# Patient Record
Sex: FEMALE | Race: WHITE | Employment: OTHER | URBAN - NONMETROPOLITAN AREA
[De-identification: names, ages, dates, MRNs, and addresses within clinical notes are randomized per-mention and may not be internally consistent; named-entity substitution may affect disease eponyms.]

---

## 2017-03-06 ENCOUNTER — HOSPITAL ENCOUNTER (OUTPATIENT)
Dept: WOMENS IMAGING | Age: 62
Discharge: HOME | End: 2017-03-06
Payer: MEDICARE

## 2017-03-06 DIAGNOSIS — Z12.31 SCREENING MAMMOGRAM, ENCOUNTER FOR: ICD-10-CM

## 2017-03-06 PROCEDURE — G0202 SCR MAMMO BI INCL CAD: HCPCS

## 2017-03-22 ENCOUNTER — OFFICE VISIT (OUTPATIENT)
Dept: PSYCHIATRY | Age: 62
End: 2017-03-22
Payer: MEDICARE

## 2017-03-22 VITALS
HEART RATE: 92 BPM | HEIGHT: 63 IN | WEIGHT: 178.2 LBS | DIASTOLIC BLOOD PRESSURE: 89 MMHG | BODY MASS INDEX: 31.57 KG/M2 | SYSTOLIC BLOOD PRESSURE: 140 MMHG | OXYGEN SATURATION: 98 %

## 2017-03-22 DIAGNOSIS — F32.A DEPRESSION, UNSPECIFIED DEPRESSION TYPE: Primary | ICD-10-CM

## 2017-03-22 PROCEDURE — 3017F COLORECTAL CA SCREEN DOC REV: CPT | Performed by: PSYCHIATRY & NEUROLOGY

## 2017-03-22 PROCEDURE — G8427 DOCREV CUR MEDS BY ELIG CLIN: HCPCS | Performed by: PSYCHIATRY & NEUROLOGY

## 2017-03-22 PROCEDURE — 1036F TOBACCO NON-USER: CPT | Performed by: PSYCHIATRY & NEUROLOGY

## 2017-03-22 PROCEDURE — 99214 OFFICE O/P EST MOD 30 MIN: CPT | Performed by: PSYCHIATRY & NEUROLOGY

## 2017-03-22 PROCEDURE — G8419 CALC BMI OUT NRM PARAM NOF/U: HCPCS | Performed by: PSYCHIATRY & NEUROLOGY

## 2017-03-22 PROCEDURE — G8484 FLU IMMUNIZE NO ADMIN: HCPCS | Performed by: PSYCHIATRY & NEUROLOGY

## 2017-03-22 PROCEDURE — 3014F SCREEN MAMMO DOC REV: CPT | Performed by: PSYCHIATRY & NEUROLOGY

## 2017-03-22 RX ORDER — PHENTERMINE HYDROCHLORIDE 37.5 MG/1
37.5 TABLET ORAL
COMMUNITY

## 2017-06-27 ENCOUNTER — OFFICE VISIT (OUTPATIENT)
Dept: PSYCHIATRY | Age: 62
End: 2017-06-27
Payer: MEDICARE

## 2017-06-27 VITALS
HEART RATE: 98 BPM | DIASTOLIC BLOOD PRESSURE: 86 MMHG | SYSTOLIC BLOOD PRESSURE: 151 MMHG | WEIGHT: 176 LBS | OXYGEN SATURATION: 97 % | HEIGHT: 63 IN | BODY MASS INDEX: 31.18 KG/M2

## 2017-06-27 DIAGNOSIS — F33.0 MILD EPISODE OF RECURRENT MAJOR DEPRESSIVE DISORDER (HCC): Primary | ICD-10-CM

## 2017-06-27 PROCEDURE — G8419 CALC BMI OUT NRM PARAM NOF/U: HCPCS | Performed by: PSYCHIATRY & NEUROLOGY

## 2017-06-27 PROCEDURE — 1036F TOBACCO NON-USER: CPT | Performed by: PSYCHIATRY & NEUROLOGY

## 2017-06-27 PROCEDURE — 3017F COLORECTAL CA SCREEN DOC REV: CPT | Performed by: PSYCHIATRY & NEUROLOGY

## 2017-06-27 PROCEDURE — 3014F SCREEN MAMMO DOC REV: CPT | Performed by: PSYCHIATRY & NEUROLOGY

## 2017-06-27 PROCEDURE — G8428 CUR MEDS NOT DOCUMENT: HCPCS | Performed by: PSYCHIATRY & NEUROLOGY

## 2017-06-27 PROCEDURE — 99213 OFFICE O/P EST LOW 20 MIN: CPT | Performed by: PSYCHIATRY & NEUROLOGY

## 2017-06-27 PROCEDURE — 90833 PSYTX W PT W E/M 30 MIN: CPT | Performed by: PSYCHIATRY & NEUROLOGY

## 2017-08-21 ENCOUNTER — OFFICE VISIT (OUTPATIENT)
Dept: GASTROENTEROLOGY | Facility: CLINIC | Age: 62
End: 2017-08-21

## 2017-08-21 VITALS
BODY MASS INDEX: 35.3 KG/M2 | WEIGHT: 187 LBS | DIASTOLIC BLOOD PRESSURE: 80 MMHG | TEMPERATURE: 97.1 F | OXYGEN SATURATION: 99 % | HEIGHT: 61 IN | SYSTOLIC BLOOD PRESSURE: 130 MMHG | HEART RATE: 76 BPM

## 2017-08-21 DIAGNOSIS — Z80.0 FAMILY HISTORY OF COLON CANCER: Primary | ICD-10-CM

## 2017-08-21 PROCEDURE — S0260 H&P FOR SURGERY: HCPCS | Performed by: NURSE PRACTITIONER

## 2017-08-21 RX ORDER — BUPROPION HYDROCHLORIDE 75 MG/1
TABLET ORAL
COMMUNITY
Start: 2016-09-14

## 2017-08-21 RX ORDER — ESTRADIOL VALERATE 40 MG/ML
INJECTION INTRAMUSCULAR
COMMUNITY
End: 2018-02-12

## 2017-08-21 RX ORDER — NAPROXEN SODIUM 220 MG
220 TABLET ORAL 2 TIMES DAILY PRN
COMMUNITY

## 2017-08-21 NOTE — PROGRESS NOTES
Chief Complaint   Patient presents with   • Colonoscopy     had colon 1-22-10 showed anusitis/normal     Subjective   HPI    Angelia Vidales is a 61 y.o. female who presents to office for preventative maintenance.  There is not  a personal history of colon polyps.  There is not a history of colon cancer.  She does not have complaints of nausea/vomiting, change in bowels, weight loss, no BRBPR, no melena. Reports she has feelings of bloating occasionally.  States this is normal for her and r/t her fibromyalgia. There is a family history of colon cancer-father dx apx age of early 70s.  There is not a family history of colon polyps.  Pt last colonoscopy-2010 .  Bowels do move on regular basis.      History reviewed. No pertinent past medical history.  Past Surgical History:   Procedure Laterality Date   • CHOLECYSTECTOMY     • COLONOSCOPY  01/22/2010    Anusitis, otherwise normal.   • ENDOSCOPY  11/23/2007    mild gastritis, otherwise normal   • HYSTERECTOMY     • SINUS SURGERY       Outpatient Prescriptions Marked as Taking for the 8/21/17 encounter (Office Visit) with ESTEFANIA Linda   Medication Sig Dispense Refill   • buPROPion (WELLBUTRIN) 75 MG tablet Take  by mouth.     • Cholecalciferol (VITAMIN D) 1000 units tablet Take 1,000 Units by mouth Daily.     • estradiol valerate (DELESTROGEN) 40 MG/ML injection Inject  into the shoulder, thigh, or buttocks.     • hydrOXYzine (ATARAX) 10 MG tablet Take 10 mg by mouth 3 (Three) Times a Day As Needed for Itching.     • Levothyroxine Sodium (SYNTHROID PO) Take  by mouth.     • naproxen sodium (ALEVE) 220 MG tablet Take 220 mg by mouth 2 (Two) Times a Day As Needed.       Allergies   Allergen Reactions   • Penicillins    • Pyridium [Phenazopyridine Hcl]    • Zonisamide      Social History     Social History   • Marital status:      Spouse name: N/A   • Number of children: N/A   • Years of education: N/A     Occupational History   • Not on file.      Social History Main Topics   • Smoking status: Never Smoker   • Smokeless tobacco: Never Used   • Alcohol use No   • Drug use: No   • Sexual activity: Defer     Other Topics Concern   • Not on file     Social History Narrative     Family History   Problem Relation Age of Onset   • Cancer Father      colon   • Colon cancer Father      Review of Systems   Constitutional: Negative for fatigue, fever and unexpected weight change.   HENT: Negative for hearing loss, sore throat and voice change.    Eyes: Negative for visual disturbance.   Respiratory: Negative for cough, shortness of breath and wheezing.    Cardiovascular: Negative for chest pain and palpitations.   Gastrointestinal: Negative for abdominal pain, blood in stool and vomiting.   Endocrine: Negative for polydipsia and polyuria.   Genitourinary: Negative for difficulty urinating, dysuria, hematuria and urgency.   Musculoskeletal: Negative for joint swelling and myalgias.   Skin: Negative for color change, rash and wound.   Neurological: Negative for dizziness, tremors, seizures and syncope.   Hematological: Does not bruise/bleed easily.   Psychiatric/Behavioral: Negative for agitation and confusion. The patient is not nervous/anxious.      Objective   Vitals:    08/21/17 0933   BP: 130/80   Pulse: 76   Temp: 97.1 °F (36.2 °C)   SpO2: 99%     Physical Exam   Constitutional: She is oriented to person, place, and time. She appears well-developed and well-nourished.   HENT:   Head: Normocephalic and atraumatic.   Eyes: Conjunctivae are normal. Pupils are equal, round, and reactive to light. No scleral icterus.   Neck: No JVD present. No thyroid mass and no thyromegaly present.   Cardiovascular: Normal rate, regular rhythm and normal heart sounds.  Exam reveals no gallop and no friction rub.    No murmur heard.  Pulmonary/Chest: Effort normal and breath sounds normal. No accessory muscle usage. No respiratory distress. She has no wheezes. She has no rales.    Abdominal: Soft. Bowel sounds are normal. She exhibits no distension, no ascites and no mass. There is no splenomegaly or hepatomegaly. There is no tenderness. There is no rebound and no guarding.   Genitourinary:   Genitourinary Comments: Rectal-Did not examine   Musculoskeletal: Normal range of motion. She exhibits no edema.   Neurological: She is alert and oriented to person, place, and time.   Deemed a reliable historian, able to converse without difficulty and able to move all extremities without difficulty   Skin: Skin is warm and dry.   Psychiatric: She has a normal mood and affect. Her behavior is normal.     Imaging Results (most recent)     None        Assessment/Plan   Angelia was seen today for colonoscopy.    Diagnoses and all orders for this visit:    Family history of colon cancer  -     Cancel: Case Request; Standing  -     Implement Anesthesia Orders Day of Procedure; Standing  -     Obtain Informed Consent; Standing  -     polyethylene glycol (GoLYTELY) 236 g solution; Take 3,785 mL by mouth 1 (One) Time for 1 dose. Take as directed  -     Cancel: Case Request  -     Case Request; Standing  -     Implement Anesthesia Orders Day of Procedure; Standing  -     Obtain Informed Consent; Standing  -     Case Request      Colonoscopy with Anesthesia    Advised pt to stop ASA day prior to procedure and to stop use of NSAIDs, Fish Oil, and MV 5 days prior to procedure.  Tylenol based products are ok to take.  Pt verbalized understanding.    All risks, benefits, alternatives, and indications of colonoscopy procedure have been discussed with the patient. Risks to include perforation of the colon requiring possible surgery or colostomy, risk of bleeding from biopsies or removal of colon tissue, possibility of missing a colon polyp or cancer, or adverse drug reaction.  Benefits to include the diagnosis and management of disease of the colon and rectum. Alternatives to include barium enema, radiographic  evaluation, lab testing or no intervention. Pt verbalizes understanding and agrees.     There are no Patient Instructions on file for this visit.

## 2017-08-22 PROBLEM — Z80.0 FAMILY HISTORY OF COLON CANCER: Status: ACTIVE | Noted: 2017-08-22

## 2017-09-19 ENCOUNTER — TRANSCRIBE ORDERS (OUTPATIENT)
Dept: ADMINISTRATIVE | Facility: HOSPITAL | Age: 62
End: 2017-09-19

## 2017-09-19 DIAGNOSIS — R22.1 LOCALIZED SWELLING, MASS AND LUMP, NECK: Primary | ICD-10-CM

## 2017-09-20 ENCOUNTER — ANESTHESIA (OUTPATIENT)
Dept: GASTROENTEROLOGY | Facility: HOSPITAL | Age: 62
End: 2017-09-20

## 2017-09-20 ENCOUNTER — HOSPITAL ENCOUNTER (OUTPATIENT)
Facility: HOSPITAL | Age: 62
Setting detail: HOSPITAL OUTPATIENT SURGERY
Discharge: HOME | End: 2017-09-20
Attending: INTERNAL MEDICINE | Admitting: INTERNAL MEDICINE

## 2017-09-20 ENCOUNTER — ANESTHESIA EVENT (OUTPATIENT)
Dept: GASTROENTEROLOGY | Facility: HOSPITAL | Age: 62
End: 2017-09-20

## 2017-09-20 VITALS
OXYGEN SATURATION: 100 % | HEART RATE: 67 BPM | DIASTOLIC BLOOD PRESSURE: 66 MMHG | WEIGHT: 187 LBS | RESPIRATION RATE: 19 BRPM | BODY MASS INDEX: 35.3 KG/M2 | TEMPERATURE: 97 F | SYSTOLIC BLOOD PRESSURE: 108 MMHG | HEIGHT: 61 IN

## 2017-09-20 DIAGNOSIS — Z80.0 FAMILY HISTORY OF COLON CANCER: ICD-10-CM

## 2017-09-20 PROCEDURE — 25010000002 PROPOFOL 10 MG/ML EMULSION: Performed by: NURSE ANESTHETIST, CERTIFIED REGISTERED

## 2017-09-20 PROCEDURE — 88305 TISSUE EXAM BY PATHOLOGIST: CPT | Performed by: INTERNAL MEDICINE

## 2017-09-20 PROCEDURE — 45385 COLONOSCOPY W/LESION REMOVAL: CPT | Performed by: INTERNAL MEDICINE

## 2017-09-20 RX ORDER — SODIUM CHLORIDE 0.9 % (FLUSH) 0.9 %
3 SYRINGE (ML) INJECTION AS NEEDED
Status: DISCONTINUED | OUTPATIENT
Start: 2017-09-20 | End: 2017-09-20 | Stop reason: HOSPADM

## 2017-09-20 RX ORDER — SODIUM CHLORIDE 9 MG/ML
500 INJECTION, SOLUTION INTRAVENOUS CONTINUOUS PRN
Status: DISCONTINUED | OUTPATIENT
Start: 2017-09-20 | End: 2017-09-20 | Stop reason: HOSPADM

## 2017-09-20 RX ORDER — LIDOCAINE HYDROCHLORIDE 20 MG/ML
INJECTION, SOLUTION INFILTRATION; PERINEURAL AS NEEDED
Status: DISCONTINUED | OUTPATIENT
Start: 2017-09-20 | End: 2017-09-20 | Stop reason: SURG

## 2017-09-20 RX ORDER — PROPOFOL 10 MG/ML
VIAL (ML) INTRAVENOUS AS NEEDED
Status: DISCONTINUED | OUTPATIENT
Start: 2017-09-20 | End: 2017-09-20 | Stop reason: SURG

## 2017-09-20 RX ADMIN — SODIUM CHLORIDE 500 ML: 9 INJECTION, SOLUTION INTRAVENOUS at 07:38

## 2017-09-20 RX ADMIN — PROPOFOL 220 MG: 10 INJECTION, EMULSION INTRAVENOUS at 08:35

## 2017-09-20 RX ADMIN — LIDOCAINE HYDROCHLORIDE 100 MG: 20 INJECTION, SOLUTION INFILTRATION; PERINEURAL at 08:35

## 2017-09-20 NOTE — ANESTHESIA PREPROCEDURE EVALUATION
Anesthesia Evaluation     Patient summary reviewed   no history of anesthetic complications:         Airway   Mallampati: II  TM distance: >3 FB  Neck ROM: full  no difficulty expected  Dental - normal exam     Pulmonary    (-) COPD, asthma, sleep apnea, not a smoker  Cardiovascular   Exercise tolerance: good (4-7 METS)    (-) hypertension, past MI, angina, cardiac stents      Neuro/Psych  (+) TIA,    (-) seizures, CVA  GI/Hepatic/Renal/Endo    (+)  hypothyroidism,   (-) liver disease, no renal disease, diabetes    Musculoskeletal     (+) back pain, chronic pain, myalgias,       ROS comment: fibromyalgia  Abdominal    Substance History      OB/GYN          Other                                        Anesthesia Plan    ASA 3     general   total IV anesthesia  intravenous induction   Anesthetic plan and risks discussed with patient.

## 2017-09-20 NOTE — PLAN OF CARE
Problem: GI Endoscopy (Adult)  Goal: Signs and Symptoms of Listed Potential Problems Will be Absent or Manageable (GI Endoscopy)  Outcome: Outcome(s) achieved Date Met:  09/20/17

## 2017-09-20 NOTE — PLAN OF CARE
Problem: Patient Care Overview (Adult)  Goal: Plan of Care Review  Outcome: Outcome(s) achieved Date Met:  09/20/17

## 2017-09-20 NOTE — PLAN OF CARE
Problem: Patient Care Overview (Adult)  Goal: Plan of Care Review  Outcome: Ongoing (interventions implemented as appropriate)    09/20/17 5765   Patient Care Overview   Progress improving   Outcome Evaluation   Outcome Summary/Follow up Plan no noted problems

## 2017-09-20 NOTE — ANESTHESIA POSTPROCEDURE EVALUATION
Patient: Angelia Vidales    Procedure Summary     Date Anesthesia Start Anesthesia Stop Room / Location    09/20/17 0833 0849 [facility] ENDOSCOPY 5 / [facility]COPY       Procedure Diagnosis Surgeon Provider    COLONOSCOPY WITH ANESTHESIA (N/A ) Family history of colon cancer  (Family history of colon cancer [Z80.0]) DO Daniel Liao CRNA          Anesthesia Type: general  Last vitals  BP        Temp        Pulse      Resp        SpO2          Post Anesthesia Care and Evaluation    Patient location during evaluation: PHASE II  Patient participation: complete - patient participated  Level of consciousness: awake and alert  Pain score: 0  Pain management: adequate  Airway patency: patent  Anesthetic complications: No anesthetic complications    Cardiovascular status: acceptable, hemodynamically stable and stable  Respiratory status: acceptable  Hydration status: acceptable

## 2017-09-20 NOTE — PLAN OF CARE
Problem: Patient Care Overview (Adult)  Goal: Adult Individualization and Mutuality    09/20/17 0723   Individualization   Patient Specific Preferences none

## 2017-09-21 LAB
LAB AP CASE REPORT: NORMAL
LAB AP CLINICAL INFORMATION: NORMAL
Lab: NORMAL
PATH REPORT.FINAL DX SPEC: NORMAL
PATH REPORT.GROSS SPEC: NORMAL

## 2017-09-26 ENCOUNTER — HOSPITAL ENCOUNTER (EMERGENCY)
Age: 62
Discharge: LEFT BEFORE DISCHARGE | End: 2017-09-26
Payer: MEDICARE

## 2017-09-26 ENCOUNTER — HOSPITAL ENCOUNTER (OUTPATIENT)
Dept: ULTRASOUND IMAGING | Facility: HOSPITAL | Age: 62
Discharge: HOME | End: 2017-09-26
Admitting: PHYSICIAN ASSISTANT

## 2017-09-26 VITALS
TEMPERATURE: 98.1 F | OXYGEN SATURATION: 94 % | DIASTOLIC BLOOD PRESSURE: 90 MMHG | BODY MASS INDEX: 35.12 KG/M2 | WEIGHT: 186 LBS | RESPIRATION RATE: 20 BRPM | HEIGHT: 61 IN | HEART RATE: 81 BPM | SYSTOLIC BLOOD PRESSURE: 152 MMHG

## 2017-09-26 DIAGNOSIS — R22.1 LOCALIZED SWELLING, MASS AND LUMP, NECK: ICD-10-CM

## 2017-09-26 PROCEDURE — 76536 US EXAM OF HEAD AND NECK: CPT

## 2017-09-26 PROCEDURE — 4500000002 HC ER NO CHARGE

## 2017-09-26 ASSESSMENT — PAIN DESCRIPTION - PAIN TYPE: TYPE: ACUTE PAIN

## 2017-09-26 ASSESSMENT — PAIN DESCRIPTION - DESCRIPTORS: DESCRIPTORS: TIGHTNESS

## 2017-09-26 ASSESSMENT — PAIN DESCRIPTION - LOCATION: LOCATION: ABDOMEN

## 2017-09-26 ASSESSMENT — PAIN SCALES - GENERAL: PAINLEVEL_OUTOF10: 7

## 2017-11-02 ENCOUNTER — HOSPITAL ENCOUNTER (OUTPATIENT)
Dept: CT IMAGING | Facility: HOSPITAL | Age: 62
Discharge: HOME | End: 2017-11-02
Attending: INTERNAL MEDICINE | Admitting: INTERNAL MEDICINE

## 2017-11-02 ENCOUNTER — TRANSCRIBE ORDERS (OUTPATIENT)
Dept: ADMINISTRATIVE | Facility: HOSPITAL | Age: 62
End: 2017-11-02

## 2017-11-02 DIAGNOSIS — R51.9 NONINTRACTABLE HEADACHE, UNSPECIFIED CHRONICITY PATTERN, UNSPECIFIED HEADACHE TYPE: ICD-10-CM

## 2017-11-02 DIAGNOSIS — R51.9 NONINTRACTABLE HEADACHE, UNSPECIFIED CHRONICITY PATTERN, UNSPECIFIED HEADACHE TYPE: Primary | ICD-10-CM

## 2017-11-02 LAB — CREAT BLDA-MCNC: 0.8 MG/DL (ref 0.6–1.3)

## 2017-11-02 PROCEDURE — 70496 CT ANGIOGRAPHY HEAD: CPT

## 2017-11-02 PROCEDURE — 0 IOPAMIDOL PER 1 ML: Performed by: INTERNAL MEDICINE

## 2017-11-02 PROCEDURE — 82565 ASSAY OF CREATININE: CPT

## 2017-11-02 RX ADMIN — IOPAMIDOL 75 ML: 755 INJECTION, SOLUTION INTRAVENOUS at 15:30

## 2017-11-14 ENCOUNTER — TRANSCRIBE ORDERS (OUTPATIENT)
Dept: ADMINISTRATIVE | Facility: HOSPITAL | Age: 62
End: 2017-11-14

## 2017-11-14 DIAGNOSIS — Z78.0 ASYMPTOMATIC MENOPAUSAL STATE: Primary | ICD-10-CM

## 2017-11-16 ENCOUNTER — HOSPITAL ENCOUNTER (OUTPATIENT)
Dept: BONE DENSITY | Facility: HOSPITAL | Age: 62
Discharge: HOME | End: 2017-11-16
Admitting: PHYSICIAN ASSISTANT

## 2017-11-16 DIAGNOSIS — Z78.0 ASYMPTOMATIC MENOPAUSAL STATE: ICD-10-CM

## 2017-11-16 PROCEDURE — 77080 DXA BONE DENSITY AXIAL: CPT

## 2018-02-12 ENCOUNTER — TRANSCRIBE ORDERS (OUTPATIENT)
Dept: ADMINISTRATIVE | Facility: HOSPITAL | Age: 63
End: 2018-02-12

## 2018-02-12 ENCOUNTER — APPOINTMENT (OUTPATIENT)
Dept: LAB | Facility: HOSPITAL | Age: 63
End: 2018-02-12

## 2018-02-12 ENCOUNTER — OFFICE VISIT (OUTPATIENT)
Dept: OTOLARYNGOLOGY | Facility: CLINIC | Age: 63
End: 2018-02-12

## 2018-02-12 VITALS
HEIGHT: 61 IN | TEMPERATURE: 98.2 F | DIASTOLIC BLOOD PRESSURE: 89 MMHG | SYSTOLIC BLOOD PRESSURE: 153 MMHG | BODY MASS INDEX: 34.62 KG/M2 | HEART RATE: 84 BPM | WEIGHT: 183.38 LBS

## 2018-02-12 DIAGNOSIS — R43.9 TASTE DISORDER: ICD-10-CM

## 2018-02-12 DIAGNOSIS — Z91.018 ALLERGY, FOOD: Primary | ICD-10-CM

## 2018-02-12 DIAGNOSIS — J32.9 CHRONIC SINUSITIS, UNSPECIFIED LOCATION: ICD-10-CM

## 2018-02-12 DIAGNOSIS — Z91.018 FOOD ALLERGY: ICD-10-CM

## 2018-02-12 DIAGNOSIS — J30.9 ALLERGIC RHINITIS, UNSPECIFIED CHRONICITY, UNSPECIFIED SEASONALITY, UNSPECIFIED TRIGGER: ICD-10-CM

## 2018-02-12 DIAGNOSIS — Z91.018 FOOD ALLERGY: Primary | ICD-10-CM

## 2018-02-12 PROCEDURE — 86003 ALLG SPEC IGE CRUDE XTRC EA: CPT | Performed by: NURSE PRACTITIONER

## 2018-02-12 PROCEDURE — 36415 COLL VENOUS BLD VENIPUNCTURE: CPT

## 2018-02-12 PROCEDURE — 99203 OFFICE O/P NEW LOW 30 MIN: CPT | Performed by: NURSE PRACTITIONER

## 2018-02-12 RX ORDER — BEPOTASTINE BESILATE 15 MG/ML
SOLUTION/ DROPS OPHTHALMIC
COMMUNITY
End: 2019-02-12

## 2018-02-12 RX ORDER — LEVOTHYROXINE SODIUM 137 UG/1
137 TABLET ORAL DAILY
COMMUNITY

## 2018-02-12 NOTE — PATIENT INSTRUCTIONS
Call for problems or worsening symptoms    Obtain blood for food allergies, avoidance for those.    Intranasal moisture

## 2018-02-12 NOTE — PROGRESS NOTES
YOB: 1955  Location: [facility]  Location Address: [address] [place] [address], [facility], [address] [place] [address]  Location Phone: [phone]    Chief Complaint   Patient presents with   • Sinus Problem       History of Present Illness  Angelia Coulter is a 62 y.o. female.  Angelia Coulter is here for evaluation of ENT complaints. The patient has had problems with nasal congestion, bad taste in mouth  The symptoms are not localized to a particular location. The patient has had moderate symptoms. The symptoms have been present for the last several months The symptoms are aggravated by  no identifiable factors and allergy. The symptoms are improved by no identifiable factors.  She suspects some food allergies and is trying to determine the food triggers.  She reports some sinus issues. She is using saline rinses.  She felt like she was having explosions in her head and had a CT head for that.  She had a taste disturbance that seems to have resolved since she stopped an OTC med.  She does not like typical nasal sprays because it drowns her.  She denies obvious sinus pressure or colored nasal drainage.    Most recent CT head reviewed.         Op Note  Date of Service: 2014 9:51 AM  See Report Interface      [facility] - OPERATIVE REPORT     PATIENT:                           ANGELIA COULTER  MRN:                               [id number]  VISIT ID:                          [id number]  SURGEON:                      TRE PEREZ*  OPERATION DATE:     2014  ROOM:                                     _____________________________________________________________________________     YOB: 1955.     PREOPERATIVE DIAGNOSES:  1. Chronic sinusitis.   2. Nasal polyposis.   3. Nasal septal spur to the left.   4. Inferior turbinate hypertrophy.      POSTOPERATIVE DIAGNOSES:  1. Chronic sinusitis.   2. Nasal polyposis.   3. Nasal septal spur to the left.   4. Inferior  turbinate hypertrophy.      PROCEDURES:  1. Endoscopic nasal septoplasty with spur removal.   2. Outfracturing of the bilateral inferior turbinates.   3. Functional endoscopic sinus surgery consisting of bilateral endoscopic  maxillary antrostomies, bilateral anterior ethmoidectomy, bilateral frontal  sinus balloon sinuplasty and bilateral nasal polypectomy with endoscopy.                Past Medical History:   Diagnosis Date   • Chronic rhinitis    • Chronic sinusitis    • Deviated septum    • Fibromyalgia    • GERD (gastroesophageal reflux disease)    • Hypothyroidism    • Nasal polyps    • Rosacea    • Sialoadenitis    • TMJ (dislocation of temporomandibular joint)        Past Surgical History:   Procedure Laterality Date   • APPENDECTOMY     • CHOLECYSTECTOMY     • COLONOSCOPY  01/22/2010    Anusitis, otherwise normal.   • COLONOSCOPY N/A 9/20/2017    Procedure: COLONOSCOPY WITH ANESTHESIA;  Surgeon: Deondre Aguirre DO;  Location: [facility] ENDOSCOPY;  Service:    • ENDOSCOPY  11/23/2007    mild gastritis, otherwise normal   • HYSTERECTOMY     • SINUS SURGERY  2014    [facility]       Outpatient Prescriptions Marked as Taking for the 2/12/18 encounter (Office Visit) with ESTEFANIA Issa   Medication Sig Dispense Refill   • Bepotastine Besilate 1.5 % solution Apply  to eye.     • buPROPion (WELLBUTRIN) 75 MG tablet Take  by mouth.     • Cholecalciferol (VITAMIN D) 1000 units tablet Take 1,000 Units by mouth Daily.     • DiphenhydrAMINE HCl (BENADRYL ALLERGY PO) Take  by mouth.     • hydrOXYzine (ATARAX) 10 MG tablet Take 10 mg by mouth 3 (Three) Times a Day As Needed for Itching.     • levothyroxine (SYNTHROID, LEVOTHROID) 137 MCG tablet Take 137 mcg by mouth Daily.     • naproxen sodium (ALEVE) 220 MG tablet Take 220 mg by mouth 2 (Two) Times a Day As Needed.     • vitamin E 200 UNIT capsule Take 200 Units by mouth Daily.         Pyridium [phenazopyridine hcl]; Doxycycline; Zonisamide; and Penicillins    Family  History   Problem Relation Age of Onset   • Cancer Father      colon   • Colon cancer Father        Social History     Social History   • Marital status:      Spouse name: N/A   • Number of children: N/A   • Years of education: N/A     Occupational History   • Not on file.     Social History Main Topics   • Smoking status: Never Smoker   • Smokeless tobacco: Never Used   • Alcohol use No   • Drug use: No   • Sexual activity: Defer     Other Topics Concern   • Not on file     Social History Narrative       Review of Systems   Constitutional: Negative.    HENT:        SEE HPI   Eyes: Negative.    Respiratory: Negative.    Cardiovascular: Negative.    Gastrointestinal: Negative.    Endocrine: Negative.    Genitourinary: Negative.    Musculoskeletal: Negative.    Skin: Negative.    Allergic/Immunologic: Negative.    Neurological: Negative.    Hematological: Negative.    Psychiatric/Behavioral: Negative.        Vitals:    02/12/18 1339   BP: 153/89   Pulse: 84   Temp: 98.2 °F (36.8 °C)       Body mass index is 34.65 kg/(m^2).    Objective     Physical Exam  CONSTITUTIONAL: well nourished, alert, oriented, in no acute distress     COMMUNICATION AND VOICE: able to communicate normally, normal voice quality    HEAD: normocephalic, no lesions, atraumatic, no tenderness, no masses     FACE: appearance normal, no lesions, no tenderness, no deformities, facial motion symmetric    SALIVARY GLANDS: parotid glands with no tenderness, no swelling, no masses, submandibular glands with normal size, nontender    EYES: ocular motility normal, eyelids normal, orbits normal, no proptosis, conjunctiva normal , pupils equal, round     EARS:  Hearing: response to conversational voice normal bilaterally   External Ears: auricles without lesions  Otoscopic: tympanic membrane appearance normal, no lesions, no perforation, normal mobility, no fluid    NOSE:  External Nose: structure normal, no tenderness on palpation, no nasal discharge,  no lesions, no evidence of trauma, nostrils patent   Intranasal Exam: nasal mucosa edema with mild excoration, vestibule within normal limits, inferior turbinate normal, nasal septum midline       ORAL:  Lips: upper and lower lips without lesion   Teeth: dentition within normal limits for age   Gums: gingivae healthy   Oral Mucosa: oral mucosa normal, no mucosal lesions   Floor of Mouth: Warthin’s duct patent, mucosa normal  Tongue: lingual mucosa normal without lesions, normal tongue mobility   Palate: soft and hard palates with normal mucosa and structure  Oropharynx: oropharyngeal mucosa normal    NECK: neck appearance normal, no mass,  noted without erythema or tenderness    THYROID: no overt thyromegaly, no tenderness, nodules or mass present on palpation, position midline     LYMPH NODES: no lymphadenopathy    CHEST/RESPIRATORY: respiratory effort normal    CARDIOVASCULAR: extremities without cyanosis or edema      NEUROLOGIC/PSYCHIATRIC: oriented to time, place and person, mood normal, affect appropriate, CN II-XII intact grossly    Assessment/Plan   Angelia was seen today for sinus problem.    Diagnoses and all orders for this visit:    Food allergy  -     Immunocap Testing-food Panel    Allergic rhinitis, unspecified chronicity, unspecified seasonality, unspecified trigger    Chronic sinusitis, unspecified location    Taste disorder        Orders Placed This Encounter   Procedures   • Immunocap Testing-food Panel     Return in about 1 year (around 2/12/2019).       Patient Instructions   Call for problems or worsening symptoms    Obtain blood for food allergies, avoidance for those.    Intranasal moisture

## 2018-02-15 LAB
Lab: 0
Lab: <0.1 KU/L

## 2018-02-16 LAB
APPLE IGE QN: <0.1 KU/L
APRICOT IGE QN: <0.1 KU/L
ASPARAGUS IGE QN: <0.1 KU/L
AVOCADO IGE QN: <0.1 KU/L
BAKER'S YEAST IGE QN: <0.1 KU/L
BANANA IGE QN: <0.1 KU/L
BARLEY IGE QN: <0.1 KU/L
BEEF IGE QN: <0.1 KU/L
BLUEBERRY IGE QN: <0.1 KU/L
BROCCOLI IGE QN: <0.1 KU/L
CABBAGE IGE QN: <0.1 KU/L
CARROT IGE QN: <0.1 KU/L
CASEIN IGE QN: <0.1 KU/L
CAULIFLOWER IGE QN: <0.1 KU/L
CHEESE MOLD IGE QN: <0.1 KU/L
CHERRY IGE QN: <0.1 KU/L
CHICKEN MEAT IGE QN: <0.1 KU/L
CLAM IGE QN: <0.1 KU/L
COCOA IGE QN: <0.1 KU/L
COCONUT IGE QN: <0.1 KU/L
CODFISH IGE QN: <0.1 KU/L
CORN IGE QN: <0.1 KU/L
COW MILK IGE QN: <0.1 KU/L
CRAB IGE QN: <0.1 KU/L
CUCUMBER IGE QN: <0.1 KU/L
DEPRECATED CELERY IGE RAST QL: <0.1 KU/L
EGG WHITE IGE QN: <0.1 KU/L
FLOUNDER IGE QN: <0.1 KU/L
GLUTEN IGE QN: <0.1 KU/L
GRAPE IGE QN: <0.1 KU/L
GRAPEFRUIT IGE QN: <0.1 KU/L
GREEN BEAN IGE QN: <0.1 KU/L
GREEN PEPPER IGE QN: <0.1 KU/L
HALIBUT IGE QN: <0.1 KU/L
KIWIFRUIT IGE QN: <0.1 KU/L
LAMB IGE QN: <0.1 KU/L
LEMON IGE QN: <0.1 KU/L
LENTILS IGE QN: <0.1 KU/L
LETTUCE IGE QN: <0.1 KU/L
LIME IGE QN: <0.1 KU/L
LOBSTER IGE QN: <0.1 KU/L
MACKEREL IGE QN: <0.1 KU/L
MANGO IGE QN: <0.1 KU/L
MELON IGE QN: <0.1 KU/L
MUSHROOM IGE QN: <0.1 KU/L
OAT IGE QN: <0.1 KU/L
ONION IGE QN: <0.1 KU/L
ORANGE IGE QN: <0.1 KU/L
OYSTER IGE QN: <0.1 KU/L
PEA IGE QN: <0.1 KU/L
PEACH IGE QN: <0.1 KU/L
PEANUT IGE QN: <0.1 KU/L
PEAR IGE QN: <0.1 KU/L
PINEAPPLE IGE QN: <0.1 KU/L
PLUM IGE QN: <0.1 KU/L
PORK IGE QN: <0.1 KU/L
POTATO IGE QN: <0.1 KU/L
RABBIT MEAT IGE QN: <0.1 KU/L
RYE IGE: <0.1 KU/L
SALMON IGE QN: <0.1 KU/L
SCALLOP IGE QN: <0.1 KU/L
SESAME SEED IGE: <0.1 KU/L
SHRIMP IGE: <0.1 KU/L
SOLE IGE QN: <0.1 KU/L
SOYBEAN IGE QN: <0.1 KU/L
SPINACH IGE QN: <0.1 KU/L
SQUID IGE QN: <0.1 KU/L
STRAWBERRY IGE: <0.1 KU/L
SWEET POTATO IGE QN: <0.1 KU/L
TUNA IGE QN: <0.1 KU/L
WHEAT IGE QN: <0.1 KU/L
WHITE BEAN IGE QN: <0.1 KU/L

## 2018-02-17 LAB
BLACKBERRY IGE QN: <0.1 KU/L
EGGPLANT IGE QN: <0.1 KU/L
PAPAYA IGE QN: <0.1 KU/L
SWORDFISH IGE QN: <0.1 KU/L

## 2018-02-20 LAB
CALIF WALNUT POLN IGE QN: <0.1 KU/L
MANDARIN IGE QN: <0.1 KU/L
TOMATO IGE QN: <0.1 KU/L
TROUT IGE QN: <0.1 KU/L

## 2018-03-05 ENCOUNTER — TELEPHONE (OUTPATIENT)
Dept: OTOLARYNGOLOGY | Facility: CLINIC | Age: 63
End: 2018-03-05

## 2018-03-07 ENCOUNTER — TELEPHONE (OUTPATIENT)
Dept: OTOLARYNGOLOGY | Facility: CLINIC | Age: 63
End: 2018-03-07

## 2018-12-12 ENCOUNTER — HOSPITAL ENCOUNTER (OUTPATIENT)
Dept: WOMENS IMAGING | Age: 63
Discharge: HOME | End: 2018-12-12
Payer: MEDICARE

## 2018-12-12 DIAGNOSIS — Z12.31 ENCOUNTER FOR SCREENING MAMMOGRAM FOR MALIGNANT NEOPLASM OF BREAST: ICD-10-CM

## 2018-12-12 PROCEDURE — 77067 SCR MAMMO BI INCL CAD: CPT

## 2019-02-12 ENCOUNTER — OFFICE VISIT (OUTPATIENT)
Dept: OTOLARYNGOLOGY | Facility: CLINIC | Age: 64
End: 2019-02-12

## 2019-02-12 VITALS
WEIGHT: 194.38 LBS | TEMPERATURE: 97.8 F | HEIGHT: 61 IN | BODY MASS INDEX: 36.7 KG/M2 | HEART RATE: 78 BPM | DIASTOLIC BLOOD PRESSURE: 89 MMHG | SYSTOLIC BLOOD PRESSURE: 161 MMHG

## 2019-02-12 DIAGNOSIS — G47.33 OSA (OBSTRUCTIVE SLEEP APNEA): Primary | ICD-10-CM

## 2019-02-12 DIAGNOSIS — K21.9 LARYNGOPHARYNGEAL REFLUX (LPR): ICD-10-CM

## 2019-02-12 PROCEDURE — 99214 OFFICE O/P EST MOD 30 MIN: CPT | Performed by: NURSE PRACTITIONER

## 2019-02-12 RX ORDER — LEVOTHYROXINE SODIUM 88 UG/1
TABLET ORAL
COMMUNITY

## 2019-02-12 NOTE — PATIENT INSTRUCTIONS
Gastroesophageal Reflux Disease, Adult  Normally, food travels down the esophagus and stays in the stomach to be digested. However, when a person has gastroesophageal reflux disease (GERD), food and stomach acid move back up into the esophagus. When this happens, the esophagus becomes sore and inflamed. Over time, GERD can create small holes (ulcers) in the lining of the esophagus.  What are the causes?  This condition is caused by a problem with the muscle between the esophagus and the stomach (lower esophageal sphincter, or LES). Normally, the LES muscle closes after food passes through the esophagus to the stomach. When the LES is weakened or abnormal, it does not close properly, and that allows food and stomach acid to go back up into the esophagus. The LES can be weakened by certain dietary substances, medicines, and medical conditions, including:  · Tobacco use.  · Pregnancy.  · Having a hiatal hernia.  · Heavy alcohol use.  · Certain foods and beverages, such as coffee, chocolate, onions, and peppermint.    What increases the risk?  This condition is more likely to develop in:  · People who have an increased body weight.  · People who have connective tissue disorders.  · People who use NSAID medicines.    What are the signs or symptoms?  Symptoms of this condition include:  · Heartburn.  · Difficult or painful swallowing.  · The feeling of having a lump in the throat.  · A bitter taste in the mouth.  · Bad breath.  · Having a large amount of saliva.  · Having an upset or bloated stomach.  · Belching.  · Chest pain.  · Shortness of breath or wheezing.  · Ongoing (chronic) cough or a night-time cough.  · Wearing away of tooth enamel.  · Weight loss.    Different conditions can cause chest pain. Make sure to see your health care provider if you experience chest pain.  How is this diagnosed?  Your health care provider will take a medical history and perform a physical exam. To determine if you have mild or severe  GERD, your health care provider may also monitor how you respond to treatment. You may also have other tests, including:  · An endoscopy to examine your stomach and esophagus with a small camera.  · A test that measures the acidity level in your esophagus.  · A test that measures how much pressure is on your esophagus.  · A barium swallow or modified barium swallow to show the shape, size, and functioning of your esophagus.    How is this treated?  The goal of treatment is to help relieve your symptoms and to prevent complications. Treatment for this condition may vary depending on how severe your symptoms are. Your health care provider may recommend:  · Changes to your diet.  · Medicine.  · Surgery.    Follow these instructions at home:  Diet  · Follow a diet as recommended by your health care provider. This may involve avoiding foods and drinks such as:  ? Coffee and tea (with or without caffeine).  ? Drinks that contain alcohol.  ? Energy drinks and sports drinks.  ? Carbonated drinks or sodas.  ? Chocolate and cocoa.  ? Peppermint and mint flavorings.  ? Garlic and onions.  ? Horseradish.  ? Spicy and acidic foods, including peppers, chili powder, witt powder, vinegar, hot sauces, and barbecue sauce.  ? Citrus fruit juices and citrus fruits, such as oranges, stanislav, and limes.  ? Tomato-based foods, such as red sauce, chili, salsa, and pizza with red sauce.  ? Fried and fatty foods, such as donuts, french fries, potato chips, and high-fat dressings.  ? High-fat meats, such as hot dogs and fatty cuts of red and white meats, such as rib eye steak, sausage, ham, and patiño.  ? High-fat dairy items, such as whole milk, butter, and cream cheese.  · Eat small, frequent meals instead of large meals.  · Avoid drinking large amounts of liquid with your meals.  · Avoid eating meals during the 2-3 hours before bedtime.  · Avoid lying down right after you eat.  · Do not exercise right after you eat.  General  instructions  · Pay attention to any changes in your symptoms.  · Take over-the-counter and prescription medicines only as told by your health care provider. Do not take aspirin, ibuprofen, or other NSAIDs unless your health care provider told you to do so.  · Do not use any tobacco products, including cigarettes, chewing tobacco, and e-cigarettes. If you need help quitting, ask your health care provider.  · Wear loose-fitting clothing. Do not wear anything tight around your waist that causes pressure on your abdomen.  · Raise (elevate) the head of your bed 6 inches (15cm).  · Try to reduce your stress, such as with yoga or meditation. If you need help reducing stress, ask your health care provider.  · If you are overweight, reduce your weight to an amount that is healthy for you. Ask your health care provider for guidance about a safe weight loss goal.  · Keep all follow-up visits as told by your health care provider. This is important.  Contact a health care provider if:  · You have new symptoms.  · You have unexplained weight loss.  · You have difficulty swallowing, or it hurts to swallow.  · You have wheezing or a persistent cough.  · Your symptoms do not improve with treatment.  · You have a hoarse voice.  Get help right away if:  · You have pain in your arms, neck, jaw, teeth, or back.  · You feel sweaty, dizzy, or light-headed.  · You have chest pain or shortness of breath.  · You vomit and your vomit looks like blood or coffee grounds.  · You faint.  · Your stool is bloody or black.  · You cannot swallow, drink, or eat.  This information is not intended to replace advice given to you by your health care provider. Make sure you discuss any questions you have with your health care provider.  Document Released: 09/27/2006 Document Revised: 05/17/2017 Document Reviewed: 04/13/2016  [employer] Patient Education © 2018 [employer]      Overnight pulse ox - if abnormal may need sleep study  Recommend  PPI - she defers and wishes to try nightly tums

## 2019-02-12 NOTE — PROGRESS NOTES
YOB: 1955  Location: [facility]  Location Address: [address] [place] [address], [facility], [address] [place] [address]  Location Phone: [phone]    Chief Complaint   Patient presents with   • Allergies       History of Present Illness  Angelia Vidales is a 63 y.o. female.  Angelia Vidales is here for follow up of ENT complaints. The patient has had problems with snoring, gurgling sound in throat at night, possible snoring  The symptoms are not localized to a particular location. The patient has had moderate symptoms. The symptoms have been present for the last several months The symptoms are aggravated by  no identifiable factors. The symptoms are improved by no identifiable factors. Positive for intermittent reflux.       Past Medical History:   Diagnosis Date   • Allergic rhinitis    • Chronic rhinitis    • Chronic sinusitis    • Deviated septum    • Fibromyalgia    • GERD (gastroesophageal reflux disease)    • Hypothyroidism    • Nasal polyps    • Rosacea    • Sialoadenitis    • TMJ (dislocation of temporomandibular joint)        Past Surgical History:   Procedure Laterality Date   • APPENDECTOMY     • CHOLECYSTECTOMY     • COLONOSCOPY  2010    Anusitis, otherwise normal.   • COLONOSCOPY N/A 2017    Procedure: COLONOSCOPY WITH ANESTHESIA;  Surgeon: Deondre Aguirre DO;  Location: [facility] ENDOSCOPY;  Service:    • ENDOSCOPY  2007    mild gastritis, otherwise normal   • HYSTERECTOMY     • SINUS SURGERY      resser       Outpatient Medications Marked as Taking for the 19 encounter (Office Visit) with Bailey Rocha APRN   Medication Sig Dispense Refill   • buPROPion (WELLBUTRIN) 75 MG tablet Take  by mouth.     • Cholecalciferol (VITAMIN D) 1000 units tablet Take 1,000 Units by mouth Daily.     • DiphenhydrAMINE HCl (BENADRYL ALLERGY PO) Take  by mouth.     • hydrOXYzine (ATARAX) 10 MG tablet Take 10 mg by mouth 3 (Three) Times a Day As Needed for Itching.     • levothyroxine  (SYNTHROID) 88 MCG tablet Synthroid 88 mcg tablet   Take 1 tablet every day by oral route.     • levothyroxine (SYNTHROID, LEVOTHROID) 137 MCG tablet Take 137 mcg by mouth Daily.     • naproxen sodium (ALEVE) 220 MG tablet Take 220 mg by mouth 2 (Two) Times a Day As Needed.     • [DISCONTINUED] vitamin E 200 UNIT capsule Take 200 Units by mouth Daily.         Pyridium [phenazopyridine hcl]; Doxycycline; Zonisamide; and Penicillins    Family History   Problem Relation Age of Onset   • Cancer Father         colon   • Colon cancer Father        Social History     Socioeconomic History   • Marital status:      Spouse name: Not on file   • Number of children: Not on file   • Years of education: Not on file   • Highest education level: Not on file   Social Needs   • Financial resource strain: Not on file   • Food insecurity - worry: Not on file   • Food insecurity - inability: Not on file   • Transportation needs - medical: Not on file   • Transportation needs - non-medical: Not on file   Occupational History   • Not on file   Tobacco Use   • Smoking status: Never Smoker   • Smokeless tobacco: Never Used   Substance and Sexual Activity   • Alcohol use: No   • Drug use: No   • Sexual activity: Defer   Other Topics Concern   • Not on file   Social History Narrative   • Not on file       Review of Systems   Constitutional: Negative.    HENT:        SEE HPI   Eyes: Negative.    Respiratory: Negative.    Cardiovascular: Negative.    Gastrointestinal: Negative.    Endocrine: Negative.    Genitourinary: Negative.    Musculoskeletal: Negative.    Skin: Negative.    Allergic/Immunologic: Negative.    Neurological: Negative.    Hematological: Negative.    Psychiatric/Behavioral: Negative.        Vitals:    02/12/19 1346   BP: 161/89   Pulse: 78   Temp: 97.8 °F (36.6 °C)       Body mass index is 36.73 kg/m².    Objective     Physical Exam  CONSTITUTIONAL: well nourished, alert, oriented, in no acute distress     COMMUNICATION  AND VOICE: able to communicate normally, normal voice quality    HEAD: normocephalic, no lesions, atraumatic, no tenderness, no masses     FACE: appearance normal, no lesions, no tenderness, no deformities, facial motion symmetric    SALIVARY GLANDS: parotid glands with no tenderness, no swelling, no masses, submandibular glands with normal size, nontender    EYES: ocular motility normal, eyelids normal, orbits normal, no proptosis, conjunctiva normal , pupils equal, round     EARS:  Hearing: response to conversational voice normal bilaterally   External Ears: auricles without lesions  Otoscopic: tympanic membrane appearance normal, no lesions, no perforation, normal mobility, no fluid    NOSE:  External Nose: structure normal, no tenderness on palpation, no nasal discharge, no lesions, no evidence of trauma, nostrils patent   Intranasal Exam: nasal mucosa normal, vestibule within normal limits, inferior turbinate normal, nasal septum midline     ORAL:  Lips: upper and lower lips without lesion   Teeth: dentition within normal limits for age   Gums: gingivae healthy   Oral Mucosa: oral mucosa normal, no mucosal lesions   Floor of Mouth: Warthin’s duct patent, mucosa normal  Tongue: lingual mucosa normal without lesions, normal tongue mobility   Palate: soft and hard palates with normal mucosa and structure  Oropharynx: oropharyngeal mucosa normal    NECK: neck appearance normal, no mass,  noted without erythema or tenderness    THYROID: no overt thyromegaly, no tenderness, nodules or mass present on palpation, position midline     LYMPH NODES: no lymphadenopathy    CHEST/RESPIRATORY: respiratory effort normal    CARDIOVASCULAR: extremities without cyanosis or edema      NEUROLOGIC/PSYCHIATRIC: oriented to time, place and person, mood normal, affect appropriate, CN II-XII intact grossly    Assessment/Plan   Angelia was seen today for allergies.    Diagnoses and all orders for this visit:    DANNA (obstructive sleep  apnea)  -     Overnight Sleep Oximetry Study; Future    Laryngopharyngeal reflux (LPR)      * Surgery not found *  Orders Placed This Encounter   Procedures   • Overnight Sleep Oximetry Study     Standing Status:   Future     Standing Expiration Date:   2/12/2020     Return in about 6 months (around 8/12/2019).       Patient Instructions   Gastroesophageal Reflux Disease, Adult  Normally, food travels down the esophagus and stays in the stomach to be digested. However, when a person has gastroesophageal reflux disease (GERD), food and stomach acid move back up into the esophagus. When this happens, the esophagus becomes sore and inflamed. Over time, GERD can create small holes (ulcers) in the lining of the esophagus.  What are the causes?  This condition is caused by a problem with the muscle between the esophagus and the stomach (lower esophageal sphincter, or LES). Normally, the LES muscle closes after food passes through the esophagus to the stomach. When the LES is weakened or abnormal, it does not close properly, and that allows food and stomach acid to go back up into the esophagus. The LES can be weakened by certain dietary substances, medicines, and medical conditions, including:  · Tobacco use.  · Pregnancy.  · Having a hiatal hernia.  · Heavy alcohol use.  · Certain foods and beverages, such as coffee, chocolate, onions, and peppermint.    What increases the risk?  This condition is more likely to develop in:  · People who have an increased body weight.  · People who have connective tissue disorders.  · People who use NSAID medicines.    What are the signs or symptoms?  Symptoms of this condition include:  · Heartburn.  · Difficult or painful swallowing.  · The feeling of having a lump in the throat.  · A bitter taste in the mouth.  · Bad breath.  · Having a large amount of saliva.  · Having an upset or bloated stomach.  · Belching.  · Chest pain.  · Shortness of breath or wheezing.  · Ongoing (chronic)  cough or a night-time cough.  · Wearing away of tooth enamel.  · Weight loss.    Different conditions can cause chest pain. Make sure to see your health care provider if you experience chest pain.  How is this diagnosed?  Your health care provider will take a medical history and perform a physical exam. To determine if you have mild or severe GERD, your health care provider may also monitor how you respond to treatment. You may also have other tests, including:  · An endoscopy to examine your stomach and esophagus with a small camera.  · A test that measures the acidity level in your esophagus.  · A test that measures how much pressure is on your esophagus.  · A barium swallow or modified barium swallow to show the shape, size, and functioning of your esophagus.    How is this treated?  The goal of treatment is to help relieve your symptoms and to prevent complications. Treatment for this condition may vary depending on how severe your symptoms are. Your health care provider may recommend:  · Changes to your diet.  · Medicine.  · Surgery.    Follow these instructions at home:  Diet  · Follow a diet as recommended by your health care provider. This may involve avoiding foods and drinks such as:  ? Coffee and tea (with or without caffeine).  ? Drinks that contain alcohol.  ? Energy drinks and sports drinks.  ? Carbonated drinks or sodas.  ? Chocolate and cocoa.  ? Peppermint and mint flavorings.  ? Garlic and onions.  ? Horseradish.  ? Spicy and acidic foods, including peppers, chili powder, witt powder, vinegar, hot sauces, and barbecue sauce.  ? Citrus fruit juices and citrus fruits, such as oranges, stanislav, and limes.  ? Tomato-based foods, such as red sauce, chili, salsa, and pizza with red sauce.  ? Fried and fatty foods, such as donuts, french fries, potato chips, and high-fat dressings.  ? High-fat meats, such as hot dogs and fatty cuts of red and white meats, such as rib eye steak, sausage, ham, and  patiño.  ? High-fat dairy items, such as whole milk, butter, and cream cheese.  · Eat small, frequent meals instead of large meals.  · Avoid drinking large amounts of liquid with your meals.  · Avoid eating meals during the 2-3 hours before bedtime.  · Avoid lying down right after you eat.  · Do not exercise right after you eat.  General instructions  · Pay attention to any changes in your symptoms.  · Take over-the-counter and prescription medicines only as told by your health care provider. Do not take aspirin, ibuprofen, or other NSAIDs unless your health care provider told you to do so.  · Do not use any tobacco products, including cigarettes, chewing tobacco, and e-cigarettes. If you need help quitting, ask your health care provider.  · Wear loose-fitting clothing. Do not wear anything tight around your waist that causes pressure on your abdomen.  · Raise (elevate) the head of your bed 6 inches (15cm).  · Try to reduce your stress, such as with yoga or meditation. If you need help reducing stress, ask your health care provider.  · If you are overweight, reduce your weight to an amount that is healthy for you. Ask your health care provider for guidance about a safe weight loss goal.  · Keep all follow-up visits as told by your health care provider. This is important.  Contact a health care provider if:  · You have new symptoms.  · You have unexplained weight loss.  · You have difficulty swallowing, or it hurts to swallow.  · You have wheezing or a persistent cough.  · Your symptoms do not improve with treatment.  · You have a hoarse voice.  Get help right away if:  · You have pain in your arms, neck, jaw, teeth, or back.  · You feel sweaty, dizzy, or light-headed.  · You have chest pain or shortness of breath.  · You vomit and your vomit looks like blood or coffee grounds.  · You faint.  · Your stool is bloody or black.  · You cannot swallow, drink, or eat.  This information is not intended to replace advice  given to you by your health care provider. Make sure you discuss any questions you have with your health care provider.  Document Released: 09/27/2006 Document Revised: 05/17/2017 Document Reviewed: 04/13/2016  [employer] Interactive Patient Education © 2018 [employer]      Overnight pulse ox - if abnormal may need sleep study  Recommend PPI - she defers and wishes to try nightly tums

## 2019-02-26 ENCOUNTER — TELEPHONE (OUTPATIENT)
Dept: OTOLARYNGOLOGY | Facility: CLINIC | Age: 64
End: 2019-02-26

## 2019-03-03 ENCOUNTER — NURSE TRIAGE (OUTPATIENT)
Dept: CALL CENTER | Facility: HOSPITAL | Age: 64
End: 2019-03-03

## 2019-03-04 ENCOUNTER — TELEPHONE (OUTPATIENT)
Dept: OTOLARYNGOLOGY | Facility: CLINIC | Age: 64
End: 2019-03-04

## 2019-03-04 NOTE — TELEPHONE ENCOUNTER
Spoke to Nadine who states that pt has not yet returned pulse ox. Message relayed to Bailey HENDRICKSON who has been inquiring about her test results.

## 2019-03-04 NOTE — TELEPHONE ENCOUNTER
"    Reason for Disposition  • SEVERE coughing spells (e.g., whooping sound after coughing, vomiting after coughing)    Additional Information  • Negative: Severe difficulty breathing (e.g., struggling for each breath, speaks in single words)  • Negative: Bluish (or gray) lips or face now  • Negative: [1] Rapid onset of cough AND [2] has hives  • Negative: Coughing started suddenly after medicine, an allergic food or bee sting  • Negative: [1] Difficulty breathing AND [2] exposure to flames, smoke, or fumes  • Negative: [1] Stridor AND [2] difficulty breathing  • Negative: Sounds like a life-threatening emergency to the triager  • Negative: Choked on object of food that could be caught in the throat  • Negative: Chest pain is main symptom  • Negative: [1] Previous asthma attacks AND [2] this feels like asthma attack  • Negative: Cough lasts > 3 weeks  • Negative: Wet (productive) cough (i.e., white-yellow, yellow, green, or shi colored sputum)  • Negative: Chest pain  (Exception: MILD central chest pain, present only when coughing)  • Negative: Difficulty breathing  • Negative: Patient sounds very sick or weak to the triager  • Negative: [1] Coughed up blood AND [2] > 1 tablespoon (15 ml) (Exception: blood-tinged sputum)  • Negative: Fever > 103 F (39.4 C)  • Negative: [1] Fever > 101 F (38.3 C) AND [2] age > 60  • Negative: [1] Fever > 100.0 F (37.8 C) AND [2] bedridden (e.g., nursing home patient, CVA, chronic illness, recovering from surgery)  • Negative: [1] Fever > 100.0 F (37.8 C) AND [2] diabetes mellitus or weak immune system (e.g., HIV positive, cancer chemo, splenectomy, chronic steroids)  • Negative: Wheezing is present  • Negative: [1] Ankle swelling AND [2] swelling is increasing    Answer Assessment - Initial Assessment Questions  1. ONSET: \"When did the cough begin?\"       Few days   2. SEVERITY: \"How bad is the cough today?\"       Severe   3. RESPIRATORY DISTRESS: \"Describe your breathing.\"       " "Breathing harder   4. FEVER: \"Do you have a fever?\" If so, ask: \"What is your temperature, how was it measured, and when did it start?\"      No   5. HEMOPTYSIS: \"Are you coughing up any blood?\" If so ask: \"How much?\" (flecks, streaks, tablespoons, etc.)      No   6. TREATMENT: \"What have you done so far to treat the cough?\" (e.g., meds, fluids, humidifier)      OTC cough medication, honey, cough drops, vicks  7. CARDIAC HISTORY: \"Do you have any history of heart disease?\" (e.g., heart attack, congestive heart failure)       No   8. LUNG HISTORY: \"Do you have any history of lung disease?\"  (e.g., pulmonary embolus, asthma, emphysema)      No   9. PE RISK FACTORS: \"Do you have a history of blood clots?\" (or: recent major surgery, recent prolonged travel, bedridden )      No   10. OTHER SYMPTOMS: \"Do you have any other symptoms? (e.g., runny nose, wheezing, chest pain)        Cough, congestion   11. PREGNANCY: \"Is there any chance you are pregnant?\" \"When was your last menstrual period?\"        No   12. TRAVEL: \"Have you traveled out of the country in the last month?\" (e.g., travel history, exposures)        No    Protocols used: COUGH - ACUTE NON-PRODUCTIVE-ADULT-AH      "

## 2019-03-13 ENCOUNTER — DOCUMENTATION (OUTPATIENT)
Dept: OTOLARYNGOLOGY | Facility: CLINIC | Age: 64
End: 2019-03-13

## 2019-03-13 NOTE — PROGRESS NOTES
Overnight pulse ox discussed - recommend sleep study.  Patient prefers weight loss and conservative management and to call me if further problems should arise

## 2019-09-23 ENCOUNTER — TRANSCRIBE ORDERS (OUTPATIENT)
Dept: ADMINISTRATIVE | Facility: HOSPITAL | Age: 64
End: 2019-09-23

## 2019-09-23 DIAGNOSIS — E66.9 OBESITY, UNSPECIFIED CLASSIFICATION, UNSPECIFIED OBESITY TYPE, UNSPECIFIED WHETHER SERIOUS COMORBIDITY PRESENT: Primary | ICD-10-CM

## 2019-10-17 ENCOUNTER — NURSE TRIAGE (OUTPATIENT)
Dept: CALL CENTER | Facility: HOSPITAL | Age: 64
End: 2019-10-17

## 2019-10-17 NOTE — TELEPHONE ENCOUNTER
"    Reason for Disposition  • General information question, no triage required and triager able to answer question    Additional Information  • Negative: [1] Caller is not with the adult (patient) AND [2] reporting urgent symptoms  • Negative: Lab result questions  • Negative: Medication questions  • Negative: Caller cannot be reached by phone  • Negative: Caller has already spoken to PCP or another triager  • Negative: RN needs further essential information from caller in order to complete triage  • Negative: Requesting regular office appointment  • Negative: [1] Caller requesting NON-URGENT health information AND [2] PCP's office is the best resource  • Negative: Health Information question, no triage required and triager able to answer question    Answer Assessment - Initial Assessment Questions  1. REASON FOR CALL or QUESTION: \"What is your reason for calling today?\" or \"How can I best help you?\" or \"What question do you have that I can help answer?\"      Caller asking if [facility] has an outpatient center. I called PT and was given information [facility] on [address] phone number . Information given to caller.    Protocols used: INFORMATION ONLY CALL-ADULT-      "

## 2019-12-12 ENCOUNTER — HOSPITAL ENCOUNTER (OUTPATIENT)
Dept: NUTRITION | Facility: HOSPITAL | Age: 64
Discharge: HOME | End: 2019-12-12
Admitting: PHYSICIAN ASSISTANT

## 2019-12-12 VITALS — BODY MASS INDEX: 38.09 KG/M2 | WEIGHT: 194 LBS | HEIGHT: 60 IN

## 2019-12-12 PROCEDURE — 97802 MEDICAL NUTRITION INDIV IN: CPT | Performed by: DIETITIAN, REGISTERED

## 2019-12-12 NOTE — PROGRESS NOTES
"Adult Outpatient Nutrition  Assessment/PES    Patient Name:  Angelia Vidales  YOB: 1955  MRN: [id number]    Assessment Date:  12/12/2019    Comments: Pt reports she has had unintentional weigh gain over the past few years and wants to lose weight to be healthier. Provided pt with written materials on heart healthy eating, label reading, portion sizes, weight loss tips, weight loss cooking methods and meal plan provided as well. Encouraged pt to try some form of physical activity that is low impact due to pt has pain from her hx of fibromyalgia such as yoga for example. Encouraged pt to journal her food intake and to weight herself no more than once a week. Pt provided RD name and contact information for any additional questions or concerns. Thank you for this referral.    General Info     Row Name 12/12/19 1657       Today's Session    Person(s) attending today's session  Patient;Family     Services Used Today?  No       General Information    How Well Do You Speak English?  well    Preferred Language  English    Lives With  spouse        Physical Findings     Row Name 12/12/19 1657          Physical Findings    Overall Physical Appearance  obese         Anthropometrics     Row Name 12/12/19 1657          Anthropometrics    Height  152.4 cm (60\")     Weight  88 kg (194 lb) 9/23/19 Office weight        Ideal Body Weight (IBW)    Ideal Body Weight (IBW) (kg)  45.86     % Ideal Body Weight  191.88        Body Mass Index (BMI)    BMI (kg/m2)  37.97         Nutritional Info/Activity     Row Name 12/12/19 0780       Nutritional Information    Have you had weight changes?  Yes    Describe weight changes  unintentional weight gain over past few years    What is your motivation to lose weight?  to be healthier    Supplemental Drinks/Foods/Additives  Ensure    History of eating disorder?  No    Do you have difficulty chewing food?  No    List any food cravings/trigger foods you have  sweets "    How often during the day do you find yourself snacking?  snacks often    Food Behaviors  Stress eater    How many times do you eat baked goods per day?  -- on occasion    How many times do you eat desserts per day?  -- occasionally    How many times do you eat snack foods per day?  -- occasionally    How many diet sodas do you drink per day?  -- few sodas, drinks sweet tea    How many servings of artificial sweetner do you have per day?  -- uses regular sugar and stevia     How many meals do you eat each day?  -- 2    How many snacks do you eat each day?  -- 1-2 times per day    What is the biggest challenge you have with your diet?  Poor choices;Weight maintenance    What type of support do you currently use to help you with your health issues?  Pt reports she has anxiety and has had reflux and gets anxious with foods per discussion today.    Enter everything you can remember eating in the last 24 hours (1 day)  2 Ensure today, 6 Ensures yesterday,water, and some pieces of ham last pm. Pt reports she has been told she may have surgery at end of the year and was encouraged to be on liquid diet for 2 weeks prior to surgery.        Eating Environment    Eating environment  Family       Physical Activity    Are you currently involved in an activity/exercise program?   No        Home Nutrition Report     Row Name 12/12/19 1704 12/12/19 9105       Home Nutrition Report    Diet  No specific  --    Typical Food/Fluid Intake  eats chili, sweets, sweet tea, snacks occasionally on process foods  --    Food Preferences  vegetables raw, likes salads, does like fish,sweet potatoes, sandiwches,soups  --    Meal/Snack Patterns  two meals per day, snacks between meals  --    Supplemental Drinks/Foods/Additives  --  Ensure    Factors Affecting Nutritional Intake  other (see comments) reflux  --        Estimated/Assessed Needs     Row Name 12/12/19 1708 12/12/19 0421       Calculation Measurements    Weight Used For Calculations   "88 kg (194 lb)  --    Height  --  152.4 cm (60\")       Estimated/Assessed Needs    Additional Documentation  KCAL/KG (Group);Calorie Requirements (Group);Protein Requirements (Group);Fluid Requirements (Group)  --       Calorie Requirements    Estimated Calorie Requirement (kcal/day)  1760  --    Estimated Calorie Need Method  kcal/kg  --    Estimated Calorie Requirement Comment  1030-3590 kcal/day  --       KCAL/KG    KCAL/KG  20 Kcal/Kg (kcal)  --    20 Kcal/Kg (kcal)  1759.96  --       Protein Requirements    Weight Used For Protein Calculations  88 kg (194 lb)  --    Est Protein Requirement Amount (gms/kg)  0.8 gm protein 70-88 gm/day   0.8-1.0 gm/kg   --    Estimated Protein Requirements (gms/day)  70.4  --       Fluid Requirements    Estimated Fluid Requirements (mL/day)  1760 1ml/kcal  --    Estimated Fluid Requirement Method  RDA Method  --    RDA Method (mL)  1760  --    Jules-Jairo Method (over 20 kg)  3259.96  --        Problem/Interventions:  Problem 1     Row Name 12/12/19 1712          Nutrition Diagnoses Problem 1    Problem 1  Overweight/Obesity     Etiology (related to)  Factors Affecting Nutrition     Reported/Observed By  Patient     Appetite  Good     Food Habit/Preferences  Fast Food     Mental State/Condition  Behavior at Mealtime;Other pt reports anxiety with her meals; reports hx of reflux and has hernia which have caused some discomfort      Reported GI Symptoms  Reflux     Poor Intake of  Breakfast;Fresh Fruit/Vegetables;Other     Best Intake of  Highly Seasoned Food;Fast Food;Lunch;Dinner;Sweet Snacks;Grains/Starches;Vegetables     Signs/Symptoms (evidenced by)  BMI excess kcal intake and inactivity     BMI  35 - 39.9         Intervention Goal     Row Name 12/12/19 3421          Intervention Goal    General  Maintain nutrition;Provide information regarding MNT for treatment/condition;Meet nutritional needs for age/condition;Reduce/improve symptoms;Disease management/therapy     Weight  " Appropriate weight loss         Education/Evaluation     Row Name 12/12/19 1720          Education    Education  Education topics;Provided education regarding;Advised regarding habits/behavior     Provided education regarding  Diet rationale;Key food habit change     Education Topics  Cardiac heart health;Gradual weight loss;Weight management - maintain Provided pt with Heart Healthy NTN Therapy, reviewed Food Labels, discussed portion sizes, Weight loss tips and weight management cooking tips, Provided sample menu plan and meal plan for 1800 kcal diet.     Advised Regarding Habits/Behavior  Appropriate portions;Appropriate beverage;Food choices;Eating pattern;Eating out;Label reading;Meal planning;Seasoning food;Self monitor        Monitor/Evaluation    Monitor  Per protocol     Education Follow-up  Reinforce PRN       Electronically signed by:  Dipika Cooley MS,RDN,LD  12/12/19 5:29 PM

## 2020-10-06 ENCOUNTER — HOSPITAL ENCOUNTER (OUTPATIENT)
Dept: WOMENS IMAGING | Age: 65
Discharge: HOME | End: 2020-10-06
Payer: MEDICARE

## 2020-10-06 PROCEDURE — 77067 SCR MAMMO BI INCL CAD: CPT

## 2021-04-09 ENCOUNTER — IMMUNIZATION (OUTPATIENT)
Age: 66
End: 2021-04-09
Payer: MEDICARE

## 2021-04-09 PROCEDURE — 0001A COVID-19, PFIZER VACCINE 30MCG/0.3ML DOSE: CPT | Performed by: FAMILY MEDICINE

## 2021-04-09 PROCEDURE — 91300 COVID-19, PFIZER VACCINE 30MCG/0.3ML DOSE: CPT | Performed by: FAMILY MEDICINE

## 2021-04-30 ENCOUNTER — IMMUNIZATION (OUTPATIENT)
Age: 66
End: 2021-04-30
Payer: MEDICARE

## 2021-04-30 PROCEDURE — 0002A COVID-19, PFIZER VACCINE 30MCG/0.3ML DOSE: CPT | Performed by: FAMILY MEDICINE

## 2021-04-30 PROCEDURE — 91300 COVID-19, PFIZER VACCINE 30MCG/0.3ML DOSE: CPT | Performed by: FAMILY MEDICINE

## 2021-05-26 PROCEDURE — 88305 TISSUE EXAM BY PATHOLOGIST: CPT | Performed by: PHYSICIAN ASSISTANT

## 2021-05-27 ENCOUNTER — LAB REQUISITION (OUTPATIENT)
Dept: LAB | Facility: HOSPITAL | Age: 66
End: 2021-05-27

## 2021-05-27 DIAGNOSIS — Z00.00 ENCOUNTER FOR GENERAL ADULT MEDICAL EXAMINATION WITHOUT ABNORMAL FINDINGS: ICD-10-CM

## 2021-05-31 LAB
CYTO UR: NORMAL
LAB AP CASE REPORT: NORMAL
LAB AP CLINICAL INFORMATION: NORMAL
PATH REPORT.FINAL DX SPEC: NORMAL
PATH REPORT.GROSS SPEC: NORMAL

## 2021-11-03 ENCOUNTER — HOSPITAL ENCOUNTER (OUTPATIENT)
Dept: WOMENS IMAGING | Age: 66
Discharge: HOME | End: 2021-11-03
Payer: MEDICARE

## 2021-11-03 DIAGNOSIS — Z12.31 BREAST CANCER SCREENING BY MAMMOGRAM: ICD-10-CM

## 2021-11-03 PROCEDURE — 77067 SCR MAMMO BI INCL CAD: CPT

## 2025-01-03 ENCOUNTER — HOSPITAL ENCOUNTER (OUTPATIENT)
Dept: WOMENS IMAGING | Age: 70
Discharge: HOME | End: 2025-01-03
Payer: MEDICARE

## 2025-01-03 VITALS — BODY MASS INDEX: 29.77 KG/M2 | WEIGHT: 168 LBS | HEIGHT: 63 IN

## 2025-01-03 DIAGNOSIS — Z12.31 ENCOUNTER FOR SCREENING MAMMOGRAM FOR MALIGNANT NEOPLASM OF BREAST: ICD-10-CM

## 2025-01-03 PROCEDURE — 77063 BREAST TOMOSYNTHESIS BI: CPT

## (undated) DEVICE — ENDOGATOR AUXILIARY WATER JET CONNECTOR: Brand: ENDOGATOR

## (undated) DEVICE — Device: Brand: DEFENDO AIR/WATER/SUCTION AND BIOPSY VALVE

## (undated) DEVICE — SENSR O2 OXIMAX FNGR A/ 18IN NONSTR

## (undated) DEVICE — SNAR POLYP CAPTIVATOR MICROHEX 13 240CM

## (undated) DEVICE — MSK O2 MD CONCENTR A/ LF 7FT 1P/U

## (undated) DEVICE — THE CHANNEL CLEANING BRUSH IS A NYLON FLEXI BRUSH ATTACHED TO A FLEXIBLE PLASTIC SHEATH DESIGNED TO SAFELY REMOVE DEBRIS FROM FLEXIBLE ENDOSCOPES.

## (undated) DEVICE — THE SINGLE USE ETRAP – POLYP TRAP IS USED FOR SUCTION RETRIEVAL OF ENDOSCOPICALLY REMOVED POLYPS.: Brand: ETRAP

## (undated) DEVICE — TBG SMPL FLTR LINE NASL 02/C02 A/ BX/100